# Patient Record
Sex: FEMALE | Race: BLACK OR AFRICAN AMERICAN | NOT HISPANIC OR LATINO | Employment: UNEMPLOYED | URBAN - METROPOLITAN AREA
[De-identification: names, ages, dates, MRNs, and addresses within clinical notes are randomized per-mention and may not be internally consistent; named-entity substitution may affect disease eponyms.]

---

## 2018-09-10 ENCOUNTER — TELEPHONE (OUTPATIENT)
Dept: TRANSPLANT | Facility: CLINIC | Age: 43
End: 2018-09-10

## 2018-09-10 NOTE — TELEPHONE ENCOUNTER
----- Message from Bonglenna Vahe sent at 9/10/2018 10:16 AM CDT -----  Rec'juan phone call fro pt Md office asking about her ref. Told her the pt has [employer] which we do not take. She said that nobody ever called the, or the pt. Will call pt and let her know.    Called pt and sp with her daughter-in-law and told her that we dont take her insur. She will let the pt know.

## 2025-02-12 ENCOUNTER — TELEPHONE (OUTPATIENT)
Dept: NEUROLOGY | Facility: CLINIC | Age: 50
End: 2025-02-12
Payer: MEDICARE

## 2025-02-12 NOTE — TELEPHONE ENCOUNTER
"----- Message from Vivien sent at 2/12/2025  9:00 AM CST -----  Regarding: appointment  "Type:  Patient Call Back    Who Called:Pau(Mother)    What is the reqeust in detail:Requesting call back to schedule appt with Dr nicole. Please advise    Can the clinic reply by [facility]?no     Best Call Back Number:[phone]      Additional Information:Pt sees doctor for Brain damage and seizures  "

## 2025-02-12 NOTE — TELEPHONE ENCOUNTER
"----- Message from Dhruv Nicole MD sent at 2/12/2025  9:45 AM CST -----  Regarding: RE: appointment  6 months  ----- Message -----  From: Ashley Marcano MA  Sent: 2/12/2025   9:34 AM CST  To: Dhruv Nicole MD  Subject: FW: appointment                                  Good morning Dr. Nicole,  I spoke with the patient, she states that she was last seen by you in October of 2024. Patient will like to know when she needs to follow-up with you? 6 months or 1 year?  ----- Message -----  From: Vivien Khanna  Sent: 2/12/2025   9:02 AM CST  To: Zahra DELANEY Staff  Subject: appointment                                      "Type:  Patient Call Back    Who Called:Pau(Mother)    What is the reqeust in detail:Requesting call back to schedule appt with Dr nicole. Please advise    Can the clinic reply by [facility]?no     Best Call Back Number:[phone]      Additional Information:Pt sees doctor for Brain damage and seizures  "

## 2025-02-12 NOTE — TELEPHONE ENCOUNTER
"----- Message from Vivien sent at 2/12/2025  9:00 AM CST -----  Regarding: appointment  "Type:  Patient Call Back    Who Called:Pau(Mother)    What is the reqeust in detail:Requesting call back to schedule appt with Dr nicole. Please advise    Can the clinic reply by [facility]?no     Best Call Back Number:[phone]      Additional Information:Pt sees doctor for Brain damage and seizures  "

## 2025-02-18 ENCOUNTER — TELEPHONE (OUTPATIENT)
Dept: NEUROSURGERY | Facility: CLINIC | Age: 50
End: 2025-02-18
Payer: MEDICARE

## 2025-02-18 NOTE — TELEPHONE ENCOUNTER
"----- Message from Dhruv Nicole MD sent at 2/18/2025 10:13 AM CST -----  Regarding: RE: appointment  1 year  ----- Message -----  From: Ashley Marcano MA  Sent: 2/12/2025   9:34 AM CST  To: Dhrvu Nicole MD  Subject: FW: appointment                                  Good morning Dr. Nicole,  I spoke with the patient, she states that she was last seen by you in October of 2024. Patient will like to know when she needs to follow-up with you? 6 months or 1 year?  ----- Message -----  From: Vivien Khanna  Sent: 2/12/2025   9:02 AM CST  To: [facility] DELANE[facility] Staff  Subject: appointment                                      "Type:  Patient Call Back    Who Called:Pau(Mother)    What is the reqeust in detail:Requesting call back to schedule appt with Dr nicole. Please advise    Can the clinic reply by [facility]?no     Best Call Back Number:[phone]      Additional Information:Pt sees doctor for Brain damage and seizures  "